# Patient Record
Sex: FEMALE | Race: WHITE
[De-identification: names, ages, dates, MRNs, and addresses within clinical notes are randomized per-mention and may not be internally consistent; named-entity substitution may affect disease eponyms.]

---

## 2017-02-27 ENCOUNTER — RX RENEWAL (OUTPATIENT)
Age: 62
End: 2017-02-27

## 2017-03-03 ENCOUNTER — APPOINTMENT (OUTPATIENT)
Dept: OBGYN | Facility: CLINIC | Age: 62
End: 2017-03-03

## 2017-03-03 ENCOUNTER — RX RENEWAL (OUTPATIENT)
Age: 62
End: 2017-03-03

## 2017-03-27 ENCOUNTER — APPOINTMENT (OUTPATIENT)
Dept: OBGYN | Facility: CLINIC | Age: 62
End: 2017-03-27

## 2017-03-27 VITALS
DIASTOLIC BLOOD PRESSURE: 100 MMHG | HEIGHT: 68 IN | SYSTOLIC BLOOD PRESSURE: 168 MMHG | WEIGHT: 142 LBS | BODY MASS INDEX: 21.52 KG/M2

## 2017-03-27 RX ORDER — ALPRAZOLAM 2 MG/1
2 TABLET ORAL TWICE DAILY
Qty: 60 | Refills: 0 | Status: DISCONTINUED | OUTPATIENT
Start: 2017-03-27 | End: 2017-03-27

## 2017-03-28 LAB — HPV HIGH+LOW RISK DNA PNL CVX: NEGATIVE

## 2017-03-31 LAB — CYTOLOGY CVX/VAG DOC THIN PREP: NORMAL

## 2017-04-26 ENCOUNTER — RX RENEWAL (OUTPATIENT)
Age: 62
End: 2017-04-26

## 2017-05-01 ENCOUNTER — RX RENEWAL (OUTPATIENT)
Age: 62
End: 2017-05-01

## 2017-06-30 ENCOUNTER — RX RENEWAL (OUTPATIENT)
Age: 62
End: 2017-06-30

## 2017-10-23 ENCOUNTER — RX RENEWAL (OUTPATIENT)
Age: 62
End: 2017-10-23

## 2017-10-30 ENCOUNTER — RX RENEWAL (OUTPATIENT)
Age: 62
End: 2017-10-30

## 2017-11-09 ENCOUNTER — APPOINTMENT (OUTPATIENT)
Dept: ORTHOPEDIC SURGERY | Facility: CLINIC | Age: 62
End: 2017-11-09
Payer: COMMERCIAL

## 2017-11-09 VITALS — BODY MASS INDEX: 21.52 KG/M2 | HEIGHT: 68 IN | WEIGHT: 142 LBS

## 2017-11-09 VITALS
BODY MASS INDEX: 21.52 KG/M2 | DIASTOLIC BLOOD PRESSURE: 109 MMHG | HEART RATE: 106 BPM | SYSTOLIC BLOOD PRESSURE: 157 MMHG | WEIGHT: 142 LBS | HEIGHT: 68 IN

## 2017-11-09 PROCEDURE — 73502 X-RAY EXAM HIP UNI 2-3 VIEWS: CPT | Mod: LT

## 2017-11-09 PROCEDURE — 99203 OFFICE O/P NEW LOW 30 MIN: CPT

## 2018-02-26 ENCOUNTER — RX RENEWAL (OUTPATIENT)
Age: 63
End: 2018-02-26

## 2018-04-11 ENCOUNTER — APPOINTMENT (OUTPATIENT)
Dept: OBGYN | Facility: CLINIC | Age: 63
End: 2018-04-11
Payer: MEDICARE

## 2018-04-11 VITALS
WEIGHT: 152 LBS | DIASTOLIC BLOOD PRESSURE: 88 MMHG | BODY MASS INDEX: 23.04 KG/M2 | HEIGHT: 68 IN | SYSTOLIC BLOOD PRESSURE: 130 MMHG

## 2018-04-11 PROCEDURE — G0101: CPT

## 2018-04-12 LAB — HPV HIGH+LOW RISK DNA PNL CVX: NOT DETECTED

## 2018-04-16 LAB — CYTOLOGY CVX/VAG DOC THIN PREP: NORMAL

## 2018-06-26 ENCOUNTER — RX RENEWAL (OUTPATIENT)
Age: 63
End: 2018-06-26

## 2019-02-06 ENCOUNTER — APPOINTMENT (OUTPATIENT)
Dept: INFECTIOUS DISEASE | Facility: CLINIC | Age: 64
End: 2019-02-06
Payer: SELF-PAY

## 2019-02-06 PROCEDURE — 90471 IMMUNIZATION ADMIN: CPT | Mod: NC

## 2019-02-06 PROCEDURE — 99401 PREV MED CNSL INDIV APPRX 15: CPT | Mod: 25

## 2019-02-06 PROCEDURE — 90632 HEPA VACCINE ADULT IM: CPT

## 2019-02-08 ENCOUNTER — APPOINTMENT (OUTPATIENT)
Dept: ORTHOPEDIC SURGERY | Facility: CLINIC | Age: 64
End: 2019-02-08
Payer: MEDICARE

## 2019-02-08 ENCOUNTER — OTHER (OUTPATIENT)
Age: 64
End: 2019-02-08

## 2019-02-08 VITALS
WEIGHT: 142 LBS | SYSTOLIC BLOOD PRESSURE: 169 MMHG | DIASTOLIC BLOOD PRESSURE: 108 MMHG | BODY MASS INDEX: 21.52 KG/M2 | HEART RATE: 72 BPM | HEIGHT: 68 IN

## 2019-02-08 PROCEDURE — 73562 X-RAY EXAM OF KNEE 3: CPT | Mod: 50

## 2019-02-08 PROCEDURE — 99214 OFFICE O/P EST MOD 30 MIN: CPT

## 2019-03-01 ENCOUNTER — APPOINTMENT (OUTPATIENT)
Dept: ORTHOPEDIC SURGERY | Facility: CLINIC | Age: 64
End: 2019-03-01

## 2019-04-01 ENCOUNTER — RX RENEWAL (OUTPATIENT)
Age: 64
End: 2019-04-01

## 2019-04-15 ENCOUNTER — APPOINTMENT (OUTPATIENT)
Dept: OBGYN | Facility: CLINIC | Age: 64
End: 2019-04-15
Payer: MEDICARE

## 2019-04-15 ENCOUNTER — RESULT REVIEW (OUTPATIENT)
Age: 64
End: 2019-04-15

## 2019-04-15 VITALS
BODY MASS INDEX: 21.82 KG/M2 | DIASTOLIC BLOOD PRESSURE: 80 MMHG | WEIGHT: 144 LBS | SYSTOLIC BLOOD PRESSURE: 140 MMHG | HEIGHT: 68 IN

## 2019-04-15 DIAGNOSIS — R92.2 INCONCLUSIVE MAMMOGRAM: ICD-10-CM

## 2019-04-15 PROCEDURE — 99213 OFFICE O/P EST LOW 20 MIN: CPT

## 2019-04-15 RX ORDER — ATOVAQUONE AND PROGUANIL HYDROCHLORIDE 250; 100 MG/1; MG/1
250-100 TABLET, FILM COATED ORAL DAILY
Qty: 15 | Refills: 0 | Status: COMPLETED | COMMUNITY
Start: 2019-02-26 | End: 2019-04-15

## 2019-04-15 RX ORDER — ZOLPIDEM TARTRATE 10 MG/1
10 TABLET ORAL
Qty: 30 | Refills: 2 | Status: COMPLETED | COMMUNITY
Start: 2017-03-27 | End: 2019-04-15

## 2019-04-15 RX ORDER — ZOLPIDEM TARTRATE 10 MG/1
10 TABLET ORAL
Qty: 30 | Refills: 2 | Status: COMPLETED | OUTPATIENT
Start: 2017-03-27 | End: 2019-04-15

## 2019-04-15 NOTE — CHIEF COMPLAINT
[FreeTextEntry1] : 63YO P2 LMP 3/04 with bilat knee pain which improved slightly with PT, c/o dry vagina and osteopenia.

## 2019-04-15 NOTE — PHYSICAL EXAM
[Awake] : awake [Alert] : alert [Acute Distress] : no acute distress [LAD] : no lymphadenopathy [Thyroid Nodule] : no thyroid nodule [Goiter] : no goiter [Mass] : no breast mass [Axillary LAD] : no axillary lymphadenopathy [Nipple Discharge] : no nipple discharge [Soft] : soft [Tender] : non tender [Distended] : not distended [H/Smegaly] : no hepatosplenomegaly [Oriented x3] : oriented to person, place, and time [Depressed Mood] : not depressed [Flat Affect] : affect not flat [Normal] : uterus [No Bleeding] : there was no active vaginal bleeding [Atrophy] : atrophy [Anteversion] : anteverted [Tenderness] : nontender [Enlarged ___ wks] : not enlarged [RRR, No Murmurs] : RRR, no murmurs [Uterine Adnexae] : were not tender and not enlarged [CTAB] : CTAB

## 2019-04-15 NOTE — HISTORY OF PRESENT ILLNESS
[Good] : being in good health [1 Year Ago] : 1 year ago [Regular Exercise] : regular exercise [Healthy Diet] : a healthy diet [Last Mammogram ___] : Last Mammogram was [unfilled] [Last Bone Density ___] : Last bone density studies [unfilled] [Last Pap ___] : Last cervical pap smear was [unfilled] [Postmenopausal] : is postmenopausal [Pregnancy History] : pregnancy history: [Up to Date] : up to date with ~his/her~ STD screening [Weight Concerns] : no concerns with her weight [Menstrual Problems] : reports normal menses

## 2019-04-22 LAB — CYTOLOGY CVX/VAG DOC THIN PREP: NORMAL

## 2019-06-13 ENCOUNTER — APPOINTMENT (OUTPATIENT)
Dept: ORTHOPEDIC SURGERY | Facility: CLINIC | Age: 64
End: 2019-06-13
Payer: MEDICARE

## 2019-06-13 PROCEDURE — 20610 DRAIN/INJ JOINT/BURSA W/O US: CPT | Mod: RT

## 2019-06-13 PROCEDURE — 99214 OFFICE O/P EST MOD 30 MIN: CPT | Mod: 25

## 2019-06-13 NOTE — PROCEDURE
[de-identified] : Injection: Right knee joint.\par Indication: Osteoarthritis.\par \par A discussion was had with the patient regarding this procedure and all questions were answered. All risks, benefits and alternatives were discussed. These included but were not limited to bleeding, infection, and allergic reaction. Alcohol was used to clean the skin, and betadine was used to sterilize and prep the area in the supero-lateral aspect of the right knee. Ethyl chloride spray was then used as a topical anesthetic. A 21-gauge needle was used to inject 4cc of 1% lidocaine and 1cc of 40mg/ml methylprednisolone into the knee. A sterile bandage was then applied. The patient tolerated the procedure well and there were no complications.

## 2019-06-13 NOTE — PHYSICAL EXAM
[de-identified] : Oriented to time, place, person\par Mood: Normal\par Affect: Normal\par Appearance: Healthy, well appearing, no acute distress.\par Gait: Normal\par Assistive Devices: None\par \par Right knee exam\par \par Skin: Clean, dry, intact\par Inspection: No obvious malalignment, no masses, moderate swelling, trace effusion\par Pulses: 2+ DP/PT pulses\par ROM: 0-130 degrees of flexion. Mild pain with deep knee flexion/extension.\par Tenderness: Mild MJLT. Positive LJLT. Positive pain over the patella facets. No pain to the quadriceps tendon. No pain to the patella tendon. Positive posterior knee tenderness.\par Stability: Stable to varus, valgus. Negative lachman testing. Negative anterior drawer, negative posterior drawer.\par Strength: 5/5 Q/H/TA/GS/EHL, without atrophy\par Neuro: In tact to light touch throughout, DTR's normal\par Additional tests: Positive McMurrays test, Negative patellar grind test. \par \par Left knee exam\par \par Skin: Clean, dry, intact\par Inspection: No obvious malalignment, no masses, no swelling, no effusion\par Pulses: 2+ DP/PT pulses\par ROM: 0-120 degrees of flexion. Patellofemoral pain with deep knee flexion/extension.\par Tenderness: No MJLT. No LJLT. Mild pain over the patella facets. No pain to the quadriceps tendon. No pain to the patella tendon. No posterior knee tenderness.\par Stability: Stable to varus, valgus. Negative lachman testing. Negative anterior drawer, negative posterior drawer.\par Strength: 5/5 Q/H/TA/GS/EHL, without atrophy\par Neuro: In tact to light touch throughout, DTR's normal\par Additional tests: Negative McMurrays test, crepitus with patellar grind test. \par \par  [de-identified] : Images were reviewed from [facility] dated 2.8.19. \par \par Multiple images right and left knee showed no evidence of bony injury, or flip dislocation. There is moderate left patellofemoral/medial underlying degenerative arthritic change seen. Mild right knee osteoarthritis. Overall alignment is maintained. Otherwise unremarkable.

## 2019-06-13 NOTE — HISTORY OF PRESENT ILLNESS
[de-identified] : 64 year old female presents today for follow up of bilateral knee pain; R>L. She was recently evaluated by Dr. Talbert and dx with OA. She completed PT in February which gave her temporary relief. The pain is constant worse with walking and descending down stairs. Aleve is helpful. Patient has been previously seen for left knee arthrosis and is status post arthroscopy and partial meniscectomy. Patient is becoming debilitated by her current function of the right knee.

## 2019-06-13 NOTE — DISCUSSION/SUMMARY
[de-identified] : 64-year-old female with bilateral knee osteoarthritis\par \par I discussed the treatment of degenerative arthritis with the patient at length today, as well as the chronic degenerative process and likely future progression of the disease. Pain may be related to the bony degenerative changes seen on XR imaging, or the associated degeneration to the soft tissues within the knee joint, including the cartilage, meniscus, or ligamentous structures.  I described the spectrum of treatment options available including nonoperative modalities to total joint arthroplasty. Noninvasive and nonoperative treatment modalities include weight reduction, activity modification with low impact exercise, PRN use of acetaminophen or anti-inflammatory medication, natural anti-inflammatory supplements, glucosamine/chondroitin, and physical therapy (for strengthening and conditioning). More invasive treatments can include injection therapies; including cortisone, hyaluronic acid (visco-supplementation), or platelet-rish-plasma (PRP) injections. Definitive treatment could also include future total joint arthroplasty if symptoms persist and cause prolonged disability or interference with activities of daily living. \par \par The risks and benefits of each treatment options was discussed and all questions were answered. At this time recommendations are for conservative and symptommatic care as detailed above with impact loading activity restriction, low impact exercise and avoidance of strenuous activity. \par \par Other recommendations include OTC NSAID's or acetaminophen (if tolerated/able), with application of ice to the knee 2-3x daily for 20 minutes or after periods of activity. \par \par Injection therapy provided right knee given the acute on chronic inflammatory pain. Left knee arthrosis worse within the patellofemoral joint, but minimal symptoms.\par \par Followup as needed

## 2019-07-16 ENCOUNTER — APPOINTMENT (OUTPATIENT)
Dept: ORTHOPEDIC SURGERY | Facility: CLINIC | Age: 64
End: 2019-07-16

## 2019-09-05 ENCOUNTER — APPOINTMENT (OUTPATIENT)
Dept: ENDOCRINOLOGY | Facility: CLINIC | Age: 64
End: 2019-09-05
Payer: MEDICARE

## 2019-09-05 VITALS
HEART RATE: 90 BPM | OXYGEN SATURATION: 97 % | BODY MASS INDEX: 21.98 KG/M2 | SYSTOLIC BLOOD PRESSURE: 120 MMHG | WEIGHT: 145 LBS | HEIGHT: 68 IN | DIASTOLIC BLOOD PRESSURE: 80 MMHG

## 2019-09-05 PROCEDURE — 99204 OFFICE O/P NEW MOD 45 MIN: CPT

## 2019-09-05 NOTE — END OF VISIT
[FreeTextEntry3] : I, Aneesh Alcantara, authored this note working as a medical scribe for Dr. Naranjo.  09/05/2019.  2:00PM. This note was authored by the medical scribe for me. I have reviewed, edited, and revised the note as needed. I am in agreement with the exam findings, imaging findings, and treatment plan.  Raphael Naranjo MD

## 2019-09-05 NOTE — CONSULT LETTER
[Dear  ___] : Dear  [unfilled], [Consult Letter:] : I had the pleasure of evaluating your patient, [unfilled]. [Please see my note below.] : Please see my note below. [Consult Closing:] : Thank you very much for allowing me to participate in the care of this patient.  If you have any questions, please do not hesitate to contact me. [FreeTextEntry2] : Theodore Duke MD\par [address], [address], \par [place], [address]\par  [Sincerely,] : Sincerely, [DrRakesh  ___] : Dr. MERINO

## 2019-09-05 NOTE — ASSESSMENT
[FreeTextEntry1] : 64 year old female being referred today for osteoporosis \par \par Pt was not previously treated for osteoporosis. No Fhx or osteoporosis related fx. FHx breast ca. \par Bone mineral density is borderline low spine but stable versus 2012. The reported rapid decrease in femoral neck in 2018 was clearly a technical error as the bone density stable versus 2017 in the area measured 2018 was markedly different. \par I have told the patient that she is at moderately increased risk for future fracture given her low spinal bone density. The options of therapy were discussed in great detail. She should stop stronitum due to its increased risk of heart  disease.\par \par   \par \par I recommended no more than Ca 500 qd in addition to dietary intake and Vit D. 1000 qd. I recommend initiating osteoporosis medication. As pt has a significant FHx of breast CA and moderate bone density. I would recommend Evista. Risks and benefits discussed to include risk of hot flashes, leg cramps and more serious blood clots. Evista has been shown to improve spine bone density while acting as a prevention against breast CA. Pt was also recommended Alendronate. Risks and benefits discussed. Pt wanted to know if the option of no therapy is an option. Pt was told of an increase risk of future fx if she does not want to start treatment now. Pt understands and will f/u after making a decision. \par  [Raloxifene Therapy] : Risks and benefits of Raloxifene therapy were discussed with the patient including blood clots, hot flashes, and cramps [Bisphosphonate Therapy] : Risks  and benefits of bisphosphonate therapy were  discussed with the patient including gastroesophageal irritation, osteonecrosis of the jaw, and atypical femur fractures, and acute phase reaction

## 2019-09-05 NOTE — HISTORY OF PRESENT ILLNESS
[Family History of Breast Cancer] : family history of breast cancer [Kidney Stones] : no history of kidney stones [FreeTextEntry1] : Ms. OSBORN is a 64 year old female being referred today for osteoporosis.\par \par Pt was not previously treated for osteoporosis. No Fhx or osteoporosis related fx. FHx breast ca. Pt states she has bad arthritis in her leg. Does weight bearing exercises and goes to gym 3x week. Pt fell down 1 year ago, swollen knees, no fx. Pt takes milk, yogurt, cheese, ice cream for dairy.\par  BMD 2019 spine -2.6, fem neck L -2.4 read as decreased 12% vs 2018 but stable vs 2017, tot hip L -2.1 c/w osteopenia -5.4%.\par \par H/o mitral valve prolapse with regurgitation. \par \par  [Uses a Walker/Cane] : no use of a walker/cane [Frequent Falls] : no frequent falls [Family History of Hip Fracture] : no family history of hip fracture [Family History of Osteoporosis] : no family history of osteoporosis

## 2019-09-05 NOTE — PHYSICAL EXAM
[Alert] : alert [No Acute Distress] : no acute distress [Well Nourished] : well nourished [Well Developed] : well developed [Normal Sclera/Conjunctiva] : normal sclera/conjunctiva [EOMI] : extra ocular movement intact [No Proptosis] : no proptosis [Normal Oropharynx] : the oropharynx was normal [Thyroid Not Enlarged] : the thyroid was not enlarged [No Thyroid Nodules] : there were no palpable thyroid nodules [No Respiratory Distress] : no respiratory distress [No Accessory Muscle Use] : no accessory muscle use [Clear to Auscultation] : lungs were clear to auscultation bilaterally [Normal Rate] : heart rate was normal  [Normal S1, S2] : normal S1 and S2 [Regular Rhythm] : with a regular rhythm [No Edema] : there was no peripheral edema [Normal Bowel Sounds] : normal bowel sounds [Soft] : abdomen soft [Not Tender] : non-tender [Post Cervical Nodes] : posterior cervical nodes [Not Distended] : not distended [Anterior Cervical Nodes] : anterior cervical nodes [Normal] : normal and non tender [No Spinal Tenderness] : no spinal tenderness [Spine Straight] : spine straight [No Stigmata of Cushings Syndrome] : no stigmata of cushings syndrome [Normal Gait] : normal gait [Normal Strength/Tone] : muscle strength and tone were normal [No Rash] : no rash [Normal Reflexes] : deep tendon reflexes were 2+ and symmetric [No Tremors] : no tremors [Oriented x3] : oriented to person, place, and time [Murmurs] : no murmurs [Acanthosis Nigricans] : no acanthosis nigricans [de-identified] : 2/6 systolic murmur

## 2019-10-28 ENCOUNTER — RX RENEWAL (OUTPATIENT)
Age: 64
End: 2019-10-28

## 2019-11-08 ENCOUNTER — APPOINTMENT (OUTPATIENT)
Dept: INFECTIOUS DISEASE | Facility: HOSPITAL | Age: 64
End: 2019-11-08
Payer: SELF-PAY

## 2019-11-08 PROCEDURE — 90717 YELLOW FEVER VACCINE SUBQ: CPT

## 2019-11-08 PROCEDURE — 99401 PREV MED CNSL INDIV APPRX 15: CPT | Mod: 25

## 2019-11-08 PROCEDURE — 90471 IMMUNIZATION ADMIN: CPT | Mod: NC

## 2019-12-09 ENCOUNTER — RX RENEWAL (OUTPATIENT)
Age: 64
End: 2019-12-09

## 2019-12-26 ENCOUNTER — OTHER (OUTPATIENT)
Age: 64
End: 2019-12-26

## 2020-01-16 ENCOUNTER — APPOINTMENT (OUTPATIENT)
Dept: ORTHOPEDIC SURGERY | Facility: CLINIC | Age: 65
End: 2020-01-16
Payer: MEDICARE

## 2020-01-16 DIAGNOSIS — M17.11 UNILATERAL PRIMARY OSTEOARTHRITIS, RIGHT KNEE: ICD-10-CM

## 2020-01-16 PROCEDURE — 99213 OFFICE O/P EST LOW 20 MIN: CPT

## 2020-01-16 PROCEDURE — 73630 X-RAY EXAM OF FOOT: CPT | Mod: RT

## 2020-01-16 RX ORDER — NAPROXEN 500 MG/1
500 TABLET ORAL TWICE DAILY
Qty: 30 | Refills: 1 | Status: ACTIVE | COMMUNITY
Start: 2020-01-16 | End: 1900-01-01

## 2020-01-17 PROBLEM — M17.11 PRIMARY OSTEOARTHRITIS OF RIGHT KNEE: Status: ACTIVE | Noted: 2019-02-08

## 2020-01-17 NOTE — ADDENDUM
[FreeTextEntry1] : This note was written by Indy Manning on 01/16/2020 acting solely as a scribe for Dr. Wilman Morelos.\par \par All medical record entries made by the Scribe were at my, Dr. Wilman Morelos, direction and personally dictated by me on 01/16/2020. I have personally reviewed the chart and agree that the record accurately reflects my personal performance of the history, physical exam, assessment and plan.\par

## 2020-01-17 NOTE — DISCUSSION/SUMMARY
[de-identified] : 64-year-old female with bilateral knee osteoarthritis\par \par A discussion was had with the patient regarding continued conservative care for bilateral knee osteoarthritis. Evidence of hallux rigidus/bunion on XR of the right foot. No further treatment necessary at this time. \par \par Recommendations: Continued conservative management as previously discussed for OA, including; activity modification, NSAIDs/tylenol/ice p.r.n. Continue PT. New Rx given. Rx for Naproxen given.\par \par Follow up as needed.\par \par

## 2020-01-17 NOTE — PHYSICAL EXAM
[de-identified] : The following radiographs were ordered and read by me during this patients visit. I reviewed each radiograph in detail with the patient and discussed the findings as highlighted below. \par \par 3 views of the right foot were obtained today, 01/16/2020, that show no acute fracture or dislocation. There is hallux rigidus seen. There is no gross malalignment. No significant other obvious osseous abnormality, otherwise unremarkable.\par \par Images were reviewed from [facility] dated 2.8.19. \par \par Multiple images right and left knee showed no evidence of bony injury, or flip dislocation. There is moderate left patellofemoral/medial underlying degenerative arthritic change seen. Mild right knee osteoarthritis. Overall alignment is maintained. Otherwise unremarkable.\par \par  [de-identified] : Oriented to time, place, person\par Mood: Normal\par Affect: Normal\par Appearance: Healthy, well appearing, no acute distress.\par Gait: Normal\par Assistive Devices: None\par \par Right knee exam\par \par Skin: Clean, dry, intact\par Inspection: No obvious malalignment, no masses, no swelling, trace effusion\par Pulses: 2+ DP/PT pulses\par ROM: 0-130 degrees of flexion. Mild pain with deep knee flexion/extension.\par Tenderness: Mild MJLT. Positive LJLT. Positive pain over the patella facets. No pain to the quadriceps tendon. No pain to the patella tendon. Positive posterior knee tenderness.\par Stability: Stable to varus, valgus. Negative lachman testing. Negative anterior drawer, negative posterior drawer.\par Strength: 5/5 Q/H/TA/GS/EHL, without atrophy\par Neuro: In tact to light touch throughout, DTR's normal\par Additional tests: Positive McMurrays test, Negative patellar grind test. \par \par Left knee exam\par \par Skin: Clean, dry, intact\par Inspection: No obvious malalignment, no masses, no swelling, no effusion\par Pulses: 2+ DP/PT pulses\par ROM: 0-120 degrees of flexion. Patellofemoral pain with deep knee flexion/extension.\par Tenderness: No MJLT. No LJLT. Mild pain over the patella facets. No pain to the quadriceps tendon. No pain to the patella tendon. No posterior knee tenderness.\par Stability: Stable to varus, valgus. Negative lachman testing. Negative anterior drawer, negative posterior drawer.\par Strength: 5/5 Q/H/TA/GS/EHL, without atrophy\par Neuro: In tact to light touch throughout, DTR's normal\par Additional tests: Negative McMurrays test, crepitus with patellar grind test. \par \par

## 2020-01-17 NOTE — HISTORY OF PRESENT ILLNESS
[de-identified] : 64 year old female presents today for follow up of bilateral knee OA; R>L. She received a cortisone injection in June 2019 in the right knee which gave her some relief for about one month. She has tried holistic approach to treat herself and attending PT 2 x per week which has been helpful. She would like to continue PT as she will be traveling soon. The pain is intermittent brought on walking and stair usage. Patient has been previously seen for left knee arthrosis and is status post arthroscopy and partial meniscectomy. Rates her pain 3/10. Also c/o foot pain.

## 2020-04-17 ENCOUNTER — APPOINTMENT (OUTPATIENT)
Dept: OBGYN | Facility: CLINIC | Age: 65
End: 2020-04-17

## 2020-08-07 ENCOUNTER — APPOINTMENT (OUTPATIENT)
Dept: OBGYN | Facility: CLINIC | Age: 65
End: 2020-08-07
Payer: MEDICARE

## 2020-08-07 VITALS
DIASTOLIC BLOOD PRESSURE: 80 MMHG | SYSTOLIC BLOOD PRESSURE: 140 MMHG | WEIGHT: 142 LBS | BODY MASS INDEX: 21.52 KG/M2 | HEIGHT: 68 IN

## 2020-08-07 DIAGNOSIS — R92.8 OTHER ABNORMAL AND INCONCLUSIVE FINDINGS ON DIAGNOSTIC IMAGING OF BREAST: ICD-10-CM

## 2020-08-07 DIAGNOSIS — Z01.419 ENCOUNTER FOR GYNECOLOGICAL EXAMINATION (GENERAL) (ROUTINE) W/OUT ABNORMAL FINDINGS: ICD-10-CM

## 2020-08-07 PROCEDURE — G0101: CPT

## 2020-08-07 RX ORDER — ATOVAQUONE AND PROGUANIL HYDROCHLORIDE 250; 100 MG/1; MG/1
250-100 TABLET, FILM COATED ORAL
Qty: 27 | Refills: 0 | Status: COMPLETED | COMMUNITY
Start: 2019-02-06 | End: 2020-08-07

## 2020-08-07 RX ORDER — AZITHROMYCIN 250 MG/1
250 TABLET, FILM COATED ORAL
Qty: 4 | Refills: 0 | Status: COMPLETED | COMMUNITY
Start: 2019-02-06 | End: 2020-08-07

## 2020-08-07 NOTE — CHIEF COMPLAINT
[Annual Visit] : annual visit [FreeTextEntry1] : 66YO P2 LMP 3/04 presents for well care check c/o arthritic pain in her knees and big toe.

## 2020-08-07 NOTE — HISTORY OF PRESENT ILLNESS
[1 Year Ago] : 1 year ago [Good] : being in good health [Healthy Diet] : a healthy diet [Regular Exercise] : regular exercise [Last Bone Density ___] : Last bone density studies [unfilled] [Last Mammogram ___] : Last Mammogram was [unfilled] [Last Colonoscopy ___] : Last colonoscopy [unfilled] [Last Pap ___] : Last cervical pap smear was [unfilled] [Postmenopausal] : is postmenopausal [Up to Date] : up to date with ~his/her~ STD screening [Weight Concerns] : no concerns with her weight [Menstrual Problems] : reports normal menses

## 2020-08-07 NOTE — PHYSICAL EXAM
[Awake] : awake [Alert] : alert [LAD] : no lymphadenopathy [Acute Distress] : no acute distress [Thyroid Nodule] : no thyroid nodule [Goiter] : no goiter [Mass] : no breast mass [Nipple Discharge] : no nipple discharge [Axillary LAD] : no axillary lymphadenopathy [Soft] : soft [Tender] : non tender [Distended] : not distended [Oriented x3] : oriented to person, place, and time [H/Smegaly] : no hepatosplenomegaly [Depressed Mood] : not depressed [Flat Affect] : affect not flat [Normal] : cervix [Atrophy] : atrophy [No Bleeding] : there was no active vaginal bleeding [Anteversion] : anteverted [Tenderness] : nontender [Uterine Adnexae] : were not tender and not enlarged [RRR, No Murmurs] : RRR, no murmurs [CTAB] : CTAB

## 2020-08-14 LAB — CYTOLOGY CVX/VAG DOC THIN PREP: NORMAL

## 2021-06-09 ENCOUNTER — RESULT REVIEW (OUTPATIENT)
Age: 66
End: 2021-06-09

## 2021-10-26 DIAGNOSIS — Z00.00 ENCOUNTER FOR GENERAL ADULT MEDICAL EXAMINATION W/OUT ABNORMAL FINDINGS: ICD-10-CM

## 2021-11-12 ENCOUNTER — NON-APPOINTMENT (OUTPATIENT)
Age: 66
End: 2021-11-12

## 2021-11-22 ENCOUNTER — APPOINTMENT (OUTPATIENT)
Dept: OBGYN | Facility: CLINIC | Age: 66
End: 2021-11-22
Payer: MEDICARE

## 2021-11-22 VITALS — BODY MASS INDEX: 20.83 KG/M2 | SYSTOLIC BLOOD PRESSURE: 132 MMHG | DIASTOLIC BLOOD PRESSURE: 88 MMHG | WEIGHT: 137 LBS

## 2021-11-22 DIAGNOSIS — M17.12 UNILATERAL PRIMARY OSTEOARTHRITIS, LEFT KNEE: ICD-10-CM

## 2021-11-22 DIAGNOSIS — N95.2 POSTMENOPAUSAL ATROPHIC VAGINITIS: ICD-10-CM

## 2021-11-22 DIAGNOSIS — M81.0 AGE-RELATED OSTEOPOROSIS W/OUT CURRENT PATHOLOGICAL FRACTURE: ICD-10-CM

## 2021-11-22 PROCEDURE — 99213 OFFICE O/P EST LOW 20 MIN: CPT

## 2021-11-22 RX ORDER — ESTRADIOL 10 UG/1
10 TABLET, FILM COATED VAGINAL
Qty: 36 | Refills: 0 | Status: COMPLETED | COMMUNITY
Start: 2017-10-30 | End: 2021-11-22

## 2021-11-22 NOTE — HISTORY OF PRESENT ILLNESS
[Patient reported mammogram was normal] : Patient reported mammogram was normal [Patient reported breast sonogram was normal] : Patient reported breast sonogram was normal [Patient reported PAP Smear was normal] : Patient reported PAP Smear was normal [FreeTextEntry1] : 67YO P2 LMP 3/04 with bad arthritis in her big toe and dry vagina. [Mammogramdate] : 11/21 [BreastSonogramDate] : 11/21 [PapSmeardate] : 2020 [BoneDensityDate] : 2021 [TextBox_37] : Tfem neck=-2.3 [ColonoscopyDate] : 2016

## 2021-11-22 NOTE — PHYSICAL EXAM
[Chaperone Present] : A chaperone was present in the examining room during all aspects of the physical examination [Appropriately responsive] : appropriately responsive [Alert] : alert [No Acute Distress] : no acute distress [No Lymphadenopathy] : no lymphadenopathy [Regular Rate Rhythm] : regular rate rhythm [No Murmurs] : no murmurs [Clear to Auscultation B/L] : clear to auscultation bilaterally [Soft] : soft [Non-tender] : non-tender [Non-distended] : non-distended [No HSM] : No HSM [No Lesions] : no lesions [No Mass] : no mass [Oriented x3] : oriented x3 [Examination Of The Breasts] : a normal appearance [No Masses] : no breast masses were palpable [Labia Majora] : normal [Labia Minora] : normal [Atrophy] : atrophy [Normal] : normal [Tenderness] : nontender [Anteversion] : anteverted [Uterine Adnexae] : normal

## 2021-11-29 DIAGNOSIS — K64.9 UNSPECIFIED HEMORRHOIDS: ICD-10-CM

## 2021-11-30 LAB — CYTOLOGY CVX/VAG DOC THIN PREP: NORMAL

## 2021-11-30 RX ORDER — PRAMOXINE HYDROCHLORIDE HYDROCORTISONE ACETATE 100; 100 MG/10G; MG/10G
1-1 AEROSOL, FOAM TOPICAL
Qty: 1 | Refills: 3 | Status: ACTIVE | COMMUNITY
Start: 2021-11-29 | End: 1900-01-01

## 2021-12-20 ENCOUNTER — TRANSCRIPTION ENCOUNTER (OUTPATIENT)
Age: 66
End: 2021-12-20

## 2022-03-20 ENCOUNTER — TRANSCRIPTION ENCOUNTER (OUTPATIENT)
Age: 67
End: 2022-03-20

## 2022-04-09 ENCOUNTER — TRANSCRIPTION ENCOUNTER (OUTPATIENT)
Age: 67
End: 2022-04-09

## 2022-07-11 ENCOUNTER — NON-APPOINTMENT (OUTPATIENT)
Age: 67
End: 2022-07-11

## 2023-06-09 ENCOUNTER — APPOINTMENT (OUTPATIENT)
Dept: OBGYN | Facility: CLINIC | Age: 68
End: 2023-06-09
Payer: MEDICARE

## 2023-06-09 VITALS — SYSTOLIC BLOOD PRESSURE: 152 MMHG | WEIGHT: 128 LBS | BODY MASS INDEX: 19.46 KG/M2 | DIASTOLIC BLOOD PRESSURE: 90 MMHG

## 2023-06-09 DIAGNOSIS — Z63.4 DISAPPEARANCE AND DEATH OF FAMILY MEMBER: ICD-10-CM

## 2023-06-09 DIAGNOSIS — M25.562 PAIN IN LEFT KNEE: ICD-10-CM

## 2023-06-09 DIAGNOSIS — Z01.419 ENCOUNTER FOR GYNECOLOGICAL EXAMINATION (GENERAL) (ROUTINE) W/OUT ABNORMAL FINDINGS: ICD-10-CM

## 2023-06-09 DIAGNOSIS — F43.21 ADJUSTMENT DISORDER WITH DEPRESSED MOOD: ICD-10-CM

## 2023-06-09 DIAGNOSIS — Z71.84 ENC FOR HEALTH COUNSELING RELATED TO TRAVEL: ICD-10-CM

## 2023-06-09 PROCEDURE — G0101: CPT

## 2023-06-09 RX ORDER — PRAMOXINE HYDROCHLORIDE HYDROCORTISONE ACETATE 100; 100 MG/10G; MG/10G
1-1 AEROSOL, FOAM TOPICAL
Qty: 1 | Refills: 3 | Status: ACTIVE | COMMUNITY
Start: 2023-06-09 | End: 1900-01-01

## 2023-06-09 SDOH — SOCIAL STABILITY - SOCIAL INSECURITY: DISSAPEARANCE AND DEATH OF FAMILY MEMBER: Z63.4
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
## 2023-06-16 LAB — CYTOLOGY CVX/VAG DOC THIN PREP: NORMAL

## 2023-09-01 ENCOUNTER — RX RENEWAL (OUTPATIENT)
Age: 68
End: 2023-09-01

## 2023-09-08 ENCOUNTER — RX RENEWAL (OUTPATIENT)
Age: 68
End: 2023-09-08

## 2023-11-30 ENCOUNTER — RX RENEWAL (OUTPATIENT)
Age: 68
End: 2023-11-30

## 2023-11-30 RX ORDER — ESCITALOPRAM OXALATE 5 MG/1
5 TABLET ORAL DAILY
Qty: 90 | Refills: 0 | Status: ACTIVE | COMMUNITY
Start: 2023-06-09 | End: 1900-01-01

## 2023-12-01 RX ORDER — ALPRAZOLAM 2 MG/1
2 TABLET ORAL
Qty: 90 | Refills: 0 | Status: ACTIVE | COMMUNITY
Start: 2017-03-27 | End: 1900-01-01

## 2025-06-20 ENCOUNTER — APPOINTMENT (OUTPATIENT)
Dept: OBGYN | Facility: CLINIC | Age: 70
End: 2025-06-20
Payer: MEDICARE

## 2025-06-20 PROCEDURE — G0101: CPT | Mod: GA
